# Patient Record
Sex: FEMALE | Race: WHITE | NOT HISPANIC OR LATINO | Employment: OTHER | URBAN - METROPOLITAN AREA
[De-identification: names, ages, dates, MRNs, and addresses within clinical notes are randomized per-mention and may not be internally consistent; named-entity substitution may affect disease eponyms.]

---

## 2021-08-12 ENCOUNTER — OFFICE VISIT (OUTPATIENT)
Dept: OBGYN CLINIC | Facility: CLINIC | Age: 61
End: 2021-08-12
Payer: COMMERCIAL

## 2021-08-12 VITALS
BODY MASS INDEX: 28.82 KG/M2 | WEIGHT: 173 LBS | SYSTOLIC BLOOD PRESSURE: 102 MMHG | HEIGHT: 65 IN | DIASTOLIC BLOOD PRESSURE: 68 MMHG

## 2021-08-12 DIAGNOSIS — Z01.419 VISIT FOR GYNECOLOGIC EXAMINATION: Primary | ICD-10-CM

## 2021-08-12 DIAGNOSIS — Z12.4 CERVICAL CANCER SCREENING: ICD-10-CM

## 2021-08-12 DIAGNOSIS — Z12.31 ENCOUNTER FOR SCREENING MAMMOGRAM FOR MALIGNANT NEOPLASM OF BREAST: ICD-10-CM

## 2021-08-12 PROCEDURE — 99386 PREV VISIT NEW AGE 40-64: CPT | Performed by: OBSTETRICS & GYNECOLOGY

## 2021-08-12 RX ORDER — HYDROCHLOROTHIAZIDE 25 MG/1
25 TABLET ORAL DAILY
COMMUNITY

## 2021-08-12 RX ORDER — FEXOFENADINE HCL AND PSEUDOEPHEDRINE HCI 180; 240 MG/1; MG/1
1 TABLET, EXTENDED RELEASE ORAL DAILY
COMMUNITY

## 2021-08-12 NOTE — PROGRESS NOTES
[id number]   [facility]    ASSESSMENT/PLAN: Jody Liu is a 64 y o  No obstetric history on file  who presents for annual gynecologic exam     Encounter for routine gynecologic examination  - Routine well woman exam completed today  - Cervical Cancer Screening: Current ASCCP Guidelines reviewed  Last Pap: Not on file   Next Pap Due: today  - COVID vaccine maderna  2021  - Breast Cancer Screening: Last Mammogram Not on file, no family hx order given  - Colorectal cancer screening was not ordered  Has box   - The following were reviewed in today's visit: breast self exam, menopause, osteoporosis, adequate intake of calcium and vitamin D, exercise, healthy diet and colonoscopy discussed and ordere    Additional problems addressed during this visit:  1  Visit for gynecologic examination    2  Encounter for screening mammogram for malignant neoplasm of breast  -     Mammo screening bilateral w 3d & cad; Future; Expected date: 2022    3  BMI 28 0-28 9,adult    4  Cervical cancer screening  -     Thinprep Tis Pap and HPV mRNA E6/E7 Reflex HPV 16,18/45    63 yo here for wellness exam   No bleeding, bloating and  Satiety  Encouraged  Wt loss and exercise  Bone health reviewed  +flu shots in a few weeks  CC:  Annual Gynecologic Examination    HPI: Jody Liu is a 64 y o  No obstetric history on file  who presents for annual gynecologic examination  63 yo here for  Wellness exam   Same partner   Minimal dryness  COVID vaccine  maderna  2021  Flu shot        The following portions of the patient's history were reviewed and updated as appropriate: She  has a past medical history of Graves disease, History of screening mammography (), Hypertension, and Seasonal allergies  She  has a past surgical history that includes Appendectomy () and  section ()  Her family history is not on file    She  reports that she has never smoked  She has never used smokeless tobacco  She reports current alcohol use  She reports that she does not use drugs  Current Outpatient Medications   Medication Sig Dispense Refill    fexofenadine-pseudoephedrine (ALLEGRA-D 24) 180-240 MG per 24 hr tablet Take 1 tablet by mouth daily      hydrochlorothiazide (HYDRODIURIL) 25 mg tablet Take 25 mg by mouth daily       No current facility-administered medications for this visit  She has No Known Allergies       Review of Systems:  All systems normal except as noted in HPI          Objective:  /68   Ht 5' 5 25" (1 657 m)   Wt 78 5 kg (173 lb)   Breastfeeding No   BMI 28 57 kg/m²    Physical Exam  Vitals and nursing note reviewed  Constitutional:       Appearance: Normal appearance  HENT:      Head: Normocephalic  Cardiovascular:      Rate and Rhythm: Normal rate and regular rhythm  Pulmonary:      Effort: Pulmonary effort is normal       Breath sounds: Normal breath sounds  Chest:      Chest wall: No mass, lacerations, swelling, tenderness or edema  Breasts: Daniel Score is 4  Breasts are symmetrical          Right: Normal  No swelling, bleeding, inverted nipple, mass, nipple discharge, skin change or tenderness  Left: No swelling, bleeding, inverted nipple, mass, nipple discharge, skin change or tenderness  Abdominal:      General: Abdomen is flat  Bowel sounds are normal       Palpations: Abdomen is soft  Genitourinary:     General: Normal vulva  Exam position: Lithotomy position  Pubic Area: No rash  Daniel stage (genital): 4       Labia:         Right: No rash, tenderness or lesion  Left: No rash, tenderness or lesion  Urethra: No urethral pain, urethral swelling or urethral lesion  Vagina: Normal       Cervix: No cervical motion tenderness or discharge        Uterus: Normal        Adnexa: Right adnexa normal and left adnexa normal       Rectum: Normal    Musculoskeletal: General: Normal range of motion  Cervical back: Neck supple  Lymphadenopathy:      Upper Body:      Right upper body: No supraclavicular, axillary or pectoral adenopathy  Left upper body: No supraclavicular, axillary or pectoral adenopathy  Lower Body: No right inguinal adenopathy  No left inguinal adenopathy  Skin:     General: Skin is warm and dry  Neurological:      Mental Status: She is alert     Psychiatric:         Mood and Affect: Mood normal

## 2021-08-17 LAB
CLINICAL INFO: NORMAL
CYTO CVX: NORMAL
DATE PREVIOUS BX: NORMAL
HPV E6+E7 MRNA CVX QL NAA+PROBE: NOT DETECTED
LMP START DATE: NORMAL
SL AMB PREV. PAP:: NORMAL
SPECIMEN SOURCE CVX/VAG CYTO: NORMAL

## 2022-08-25 ENCOUNTER — ANNUAL EXAM (OUTPATIENT)
Dept: OBGYN CLINIC | Facility: CLINIC | Age: 62
End: 2022-08-25
Payer: COMMERCIAL

## 2022-08-25 VITALS
DIASTOLIC BLOOD PRESSURE: 70 MMHG | HEIGHT: 66 IN | SYSTOLIC BLOOD PRESSURE: 108 MMHG | WEIGHT: 169.6 LBS | BODY MASS INDEX: 27.26 KG/M2

## 2022-08-25 DIAGNOSIS — Z01.419 VISIT FOR GYNECOLOGIC EXAMINATION: Primary | ICD-10-CM

## 2022-08-25 DIAGNOSIS — Z12.31 ENCOUNTER FOR SCREENING MAMMOGRAM FOR MALIGNANT NEOPLASM OF BREAST: ICD-10-CM

## 2022-08-25 DIAGNOSIS — Z78.0 POST-MENOPAUSAL: ICD-10-CM

## 2022-08-25 PROCEDURE — 99396 PREV VISIT EST AGE 40-64: CPT | Performed by: OBSTETRICS & GYNECOLOGY

## 2022-08-25 PROCEDURE — 0503F POSTPARTUM CARE VISIT: CPT | Performed by: OBSTETRICS & GYNECOLOGY

## 2022-08-25 RX ORDER — FEXOFENADINE HYDROCHLORIDE 60 MG/1
60 TABLET, FILM COATED ORAL AS NEEDED
COMMUNITY

## 2022-08-25 NOTE — PATIENT INSTRUCTIONS
Calcium 1200-1500mg + 600-1000 IU Vit D daily  Exercise 150 minutes per week minimum including weight bearing exercises  Pap with high risk HPV Q 3-5 years  Annual mammogram and monthly breast self exam recommended  Colonoscopy- done         Kegels 20 times twice daily  Silicone based lubricant with sex  Vaginal moisturizers twice weekly as needed

## 2022-08-25 NOTE — PROGRESS NOTES
[id number]   [facility] [address]    ASSESSMENT/PLAN: Tish Beckman is a 63 y o  No obstetric history on file  who presents for annual gynecologic exam     Encounter for routine gynecologic examination  - Routine well woman exam completed today  - Cervical Cancer Screening: Current ASCCP Guidelines reviewed  Last Pap: 2021   Next Pap Due:     - COVID vaccine maderna  2021  - Breast Cancer Screening: Last Mammogram Not on file, no family hx order given  Done in   2021    - Colorectal cancer screening was not ordered  Done in   cologuard   - The following were reviewed in today's visit: breast self exam, menopause, osteoporosis, adequate intake of calcium and vitamin D, exercise, healthy diet and colonoscopy discussed  Additional problems addressed during this visit:  1  Visit for gynecologic examination    2  Encounter for screening mammogram for malignant neoplasm of breast  -     Mammo screening bilateral w 3d & cad; Future; Expected date: 2023    3  Post-menopausal    62 yo here for wellness exam   No bleeding, bloating and  Satiety  Encouraged  Wt loss and exercise  Bone health reviewed  +flu shots in a few weeks  CC:  Annual Gynecologic Examination    HPI: Tish Beckman is a 63 y o  No obstetric history on file  who presents for annual gynecologic examination  65 yo here for  Wellness exam   Same partner   Minimal dryness  COVID vaccine  maderna  2021  Flu shot    Denies   Bleeding, blaoting and  Satiety       The following portions of the patient's history were reviewed and updated as appropriate: She  has a past medical history of Cervical cancer screening (2021), Graves disease, History of screening mammography (), History of screening mammography (2021), Hypertension, and Seasonal allergies  She  has a past surgical history that includes Appendectomy () and  section ()    Her family history is not on file  She  reports that she has never smoked  She has never used smokeless tobacco  She reports current alcohol use  She reports that she does not use drugs  Current Outpatient Medications   Medication Sig Dispense Refill    fexofenadine (ALLEGRA) 60 MG tablet Take 60 mg by mouth if needed      hydrochlorothiazide (HYDRODIURIL) 25 mg tablet Take 25 mg by mouth daily       No current facility-administered medications for this visit  She has No Known Allergies       Review of Systems:  All systems normal except as noted in HPI          Objective:  /70   Ht 5' 5 5" (1 664 m)   Wt 76 9 kg (169 lb 9 6 oz)   Breastfeeding No   BMI 27 79 kg/m²    Physical Exam  Vitals and nursing note reviewed  Constitutional:       Appearance: Normal appearance  HENT:      Head: Normocephalic  Cardiovascular:      Rate and Rhythm: Normal rate and regular rhythm  Pulses: Normal pulses  Heart sounds: Normal heart sounds  Pulmonary:      Effort: Pulmonary effort is normal       Breath sounds: Normal breath sounds  Chest:      Chest wall: No mass, lacerations, swelling, tenderness or edema  Breasts: Daniel Score is 4  Breasts are symmetrical       Right: Normal  No swelling, bleeding, inverted nipple, mass, nipple discharge, skin change, tenderness, axillary adenopathy or supraclavicular adenopathy  Left: No swelling, bleeding, inverted nipple, mass, nipple discharge, skin change, tenderness, axillary adenopathy or supraclavicular adenopathy  Abdominal:      General: Abdomen is flat  Bowel sounds are normal       Palpations: Abdomen is soft  Genitourinary:     General: Normal vulva  Exam position: Lithotomy position  Pubic Area: No rash  Daniel stage (genital): 4       Labia:         Right: No rash, tenderness or lesion  Left: No rash, tenderness or lesion  Urethra: No urethral pain, urethral swelling or urethral lesion  Vagina: Normal       Cervix: No cervical motion tenderness or discharge  Uterus: Normal        Adnexa: Right adnexa normal and left adnexa normal       Rectum: Normal    Musculoskeletal:         General: Normal range of motion  Cervical back: Neck supple  Lymphadenopathy:      Upper Body:      Right upper body: No supraclavicular, axillary or pectoral adenopathy  Left upper body: No supraclavicular, axillary or pectoral adenopathy  Lower Body: No right inguinal adenopathy  No left inguinal adenopathy  Skin:     General: Skin is warm and dry  Neurological:      General: No focal deficit present  Mental Status: She is alert and oriented to person, place, and time  Psychiatric:         Mood and Affect: Mood normal          Behavior: Behavior normal          Thought Content:  Thought content normal          Judgment: Judgment normal

## 2025-05-29 ENCOUNTER — OFFICE VISIT (OUTPATIENT)
Dept: OBGYN CLINIC | Facility: CLINIC | Age: 65
End: 2025-05-29
Payer: COMMERCIAL

## 2025-05-29 VITALS
BODY MASS INDEX: 27.32 KG/M2 | DIASTOLIC BLOOD PRESSURE: 70 MMHG | HEIGHT: 66 IN | WEIGHT: 170 LBS | SYSTOLIC BLOOD PRESSURE: 120 MMHG

## 2025-05-29 DIAGNOSIS — Z12.11 SCREENING FOR COLON CANCER: ICD-10-CM

## 2025-05-29 DIAGNOSIS — Z78.0 POST-MENOPAUSAL: ICD-10-CM

## 2025-05-29 DIAGNOSIS — Z01.419 ENCOUNTER FOR GYNECOLOGICAL EXAMINATION WITHOUT ABNORMAL FINDING: Primary | ICD-10-CM

## 2025-05-29 DIAGNOSIS — Z12.4 SCREENING FOR CERVICAL CANCER: ICD-10-CM

## 2025-05-29 DIAGNOSIS — Z12.31 ENCOUNTER FOR SCREENING MAMMOGRAM FOR MALIGNANT NEOPLASM OF BREAST: ICD-10-CM

## 2025-05-29 PROCEDURE — 99396 PREV VISIT EST AGE 40-64: CPT | Performed by: OBSTETRICS & GYNECOLOGY

## 2025-05-29 RX ORDER — MULTIVITAMIN
1 TABLET ORAL DAILY
COMMUNITY

## 2025-05-29 RX ORDER — METFORMIN HYDROCHLORIDE 500 MG/1
TABLET, EXTENDED RELEASE ORAL
COMMUNITY
Start: 2025-04-21

## 2025-05-29 NOTE — PATIENT INSTRUCTIONS
Calcium 1200-1500mg + 600-1000 IU Vit D daily. Exercise 150 minutes per week minimum including weight bearing exercises. Pap with high risk HPV Q 3-5 years.  Annual mammogram and monthly breast self exam recommended.  Colonoscopy-  encouraged   ordered      . Kegels 20 times twice daily. Silicone based lubricant with sex. Vaginal moisturizers twice weekly as needed.

## 2025-05-29 NOTE — PROGRESS NOTES
[facility] - [address], [address], [place] [address]    ASSESSMENT/PLAN: Maria L Serrato is a 64 y.o.  who presents for annual gynecologic exam.    Encounter for routine gynecologic examination  - Routine well woman exam completed today.  - Cervical Cancer Screening: Current ASCCP Guidelines reviewed. Last Pap: 2021 . Next Pap Due: today   - Breast Cancer Screening: Last Mammogram Not on file,  Order given   - Colorectal cancer screening was ordered.  - The following were reviewed in today's visit: breast self exam, mammography screening ordered, menopause, adequate intake of calcium and vitamin D, exercise, healthy diet, and colonoscopy discussed and ordered    Additional problems addressed during this visit:  1. Encounter for gynecological examination without abnormal finding  2. Encounter for screening mammogram for malignant neoplasm of breast  -     Mammo screening bilateral w 3d and cad; Future  3. Screening for cervical cancer  -     IGP, Aptima HPV, Rfx 16/18,45  4. Post-menopausal  -     IGP, Aptima HPV, Rfx 16/18,45  5. Screening for colon cancer  -     Cologuard      CC:  Annual Gynecologic Examination    HPI: Maria L Serrato is a 64 y.o.  who presents for annual gynecologic examination.  63 yo  here for wellness exam.   Post menopausal since early 50's  no bleeding, bloating or satiety.  Same partner .  Due for pap today.    Recent  dx of   diabetes  and  increased  cholesterol.  On Metformin   + gi upset .   Need mammogram       The following portions of the patient's history were reviewed and updated as appropriate: She  has a past medical history of Cervical cancer screening (2021), Diabetes type 2 (HCC), Graves disease, History of screening mammography (), History of screening mammography (2021), Hypertension, and Seasonal allergies.  She  has a past surgical history that includes Appendectomy () and  section ().  Her  "family history is not on file.  She  reports that she has never smoked. She has never used smokeless tobacco. She reports current alcohol use. She reports that she does not use drugs.  Current Outpatient Medications   Medication Sig Dispense Refill   • fexofenadine (ALLEGRA) 60 MG tablet Take 60 mg by mouth if needed     • hydrochlorothiazide (HYDRODIURIL) 25 mg tablet Take 25 mg by mouth in the morning.     • MAGNESIUM GLYCINATE PO Take by mouth     • metFORMIN (GLUCOPHAGE-XR) 500 mg 24 hr tablet take one tablet (500mg) by mouth twice daily with a meal     • Multiple Vitamin (multivitamin) tablet Take 1 tablet by mouth daily       No current facility-administered medications for this visit.     She has no known allergies..    Review of Systems:  All systems normal except as noted in HPI          Objective:  /70 (BP Location: Right arm, Patient Position: Sitting, Cuff Size: Standard)   Ht 5' 5.5\" (1.664 m)   Wt 77.1 kg (170 lb)   BMI 27.86 kg/m²    Physical Exam  Vitals and nursing note reviewed.   Constitutional:       Appearance: Normal appearance.   HENT:      Head: Normocephalic.     Cardiovascular:      Rate and Rhythm: Normal rate and regular rhythm.      Pulses: Normal pulses.      Heart sounds: Normal heart sounds.   Pulmonary:      Effort: Pulmonary effort is normal.      Breath sounds: Normal breath sounds.   Chest:      Chest wall: No mass, lacerations, swelling, tenderness or edema.   Breasts:     Daniel Score is 4.      Breasts are symmetrical.      Right: Normal. No swelling, bleeding, inverted nipple, mass, nipple discharge, skin change or tenderness.      Left: No swelling, bleeding, inverted nipple, mass, nipple discharge, skin change or tenderness.   Abdominal:      General: Abdomen is flat. Bowel sounds are normal.      Palpations: Abdomen is soft.   Genitourinary:     General: Normal vulva.      Exam position: Lithotomy position.      Pubic Area: No rash.       Daniel stage (genital): 4. "      Labia:         Right: No rash, tenderness or lesion.         Left: No rash, tenderness or lesion.       Urethra: No urethral pain, urethral swelling or urethral lesion.      Vagina: Normal.      Cervix: No cervical motion tenderness or discharge.      Uterus: Normal.       Adnexa: Right adnexa normal and left adnexa normal.      Rectum: Normal.     Musculoskeletal:         General: Normal range of motion.      Cervical back: Neck supple.   Lymphadenopathy:      Upper Body:      Right upper body: No supraclavicular, axillary or pectoral adenopathy.      Left upper body: No supraclavicular, axillary or pectoral adenopathy.      Lower Body: No right inguinal adenopathy. No left inguinal adenopathy.     Skin:     General: Skin is warm and dry.     Neurological:      General: No focal deficit present.      Mental Status: She is alert and oriented to person, place, and time.     Psychiatric:         Mood and Affect: Mood normal.         Behavior: Behavior normal.         Thought Content: Thought content normal.         Judgment: Judgment normal.

## 2025-06-03 LAB
CYTOLOGIST CVX/VAG CYTO: NORMAL
DX ICD CODE: NORMAL
HPV GENOTYPE REFLEX: NORMAL
HPV I/H RISK 4 DNA CVX QL PROBE+SIG AMP: NEGATIVE
OTHER STN SPEC: NORMAL
PATH REPORT.FINAL DX SPEC: NORMAL
SL AMB NOTE:: NORMAL
SL AMB SPECIMEN ADEQUACY: NORMAL
SL AMB TEST METHODOLOGY: NORMAL

## 2025-06-28 PROBLEM — Z12.11 SCREENING FOR COLON CANCER: Status: RESOLVED | Noted: 2025-05-29 | Resolved: 2025-06-28

## 2025-06-28 PROBLEM — Z01.419 ENCOUNTER FOR GYNECOLOGICAL EXAMINATION WITHOUT ABNORMAL FINDING: Status: RESOLVED | Noted: 2025-05-29 | Resolved: 2025-06-28

## 2025-06-28 PROBLEM — Z12.4 SCREENING FOR CERVICAL CANCER: Status: RESOLVED | Noted: 2025-05-29 | Resolved: 2025-06-28

## 2025-07-31 ENCOUNTER — HOSPITAL ENCOUNTER (OUTPATIENT)
Age: 65
Discharge: HOME | End: 2025-07-31
Attending: OBSTETRICS & GYNECOLOGY
Payer: COMMERCIAL

## 2025-07-31 VITALS — BODY MASS INDEX: 27.49 KG/M2 | WEIGHT: 165 LBS | HEIGHT: 65 IN

## 2025-07-31 DIAGNOSIS — Z12.31 ENCOUNTER FOR SCREENING MAMMOGRAM FOR MALIGNANT NEOPLASM OF BREAST: ICD-10-CM

## 2025-07-31 PROCEDURE — 77067 SCR MAMMO BI INCL CAD: CPT

## 2025-07-31 PROCEDURE — 77063 BREAST TOMOSYNTHESIS BI: CPT

## 2025-08-19 LAB — COLOGUARD RESULT REPORTABLE: NEGATIVE
